# Patient Record
Sex: FEMALE | Race: OTHER | HISPANIC OR LATINO
[De-identification: names, ages, dates, MRNs, and addresses within clinical notes are randomized per-mention and may not be internally consistent; named-entity substitution may affect disease eponyms.]

---

## 2019-10-08 ENCOUNTER — APPOINTMENT (OUTPATIENT)
Dept: ANTEPARTUM | Facility: CLINIC | Age: 49
End: 2019-10-08
Payer: COMMERCIAL

## 2019-10-08 ENCOUNTER — ASOB RESULT (OUTPATIENT)
Age: 49
End: 2019-10-08

## 2019-10-08 PROCEDURE — 76856 US EXAM PELVIC COMPLETE: CPT | Mod: 59

## 2019-10-08 PROCEDURE — 76830 TRANSVAGINAL US NON-OB: CPT

## 2020-07-19 ENCOUNTER — TRANSCRIPTION ENCOUNTER (OUTPATIENT)
Age: 50
End: 2020-07-19

## 2020-08-09 ENCOUNTER — TRANSCRIPTION ENCOUNTER (OUTPATIENT)
Age: 50
End: 2020-08-09

## 2020-10-18 ENCOUNTER — APPOINTMENT (OUTPATIENT)
Dept: DISASTER EMERGENCY | Facility: CLINIC | Age: 50
End: 2020-10-18

## 2020-10-18 DIAGNOSIS — Z01.818 ENCOUNTER FOR OTHER PREPROCEDURAL EXAMINATION: ICD-10-CM

## 2020-10-19 LAB — SARS-COV-2 N GENE NPH QL NAA+PROBE: NOT DETECTED

## 2020-10-25 ENCOUNTER — APPOINTMENT (OUTPATIENT)
Dept: DISASTER EMERGENCY | Facility: CLINIC | Age: 50
End: 2020-10-25

## 2020-10-27 LAB — SARS-COV-2 N GENE NPH QL NAA+PROBE: NOT DETECTED

## 2020-11-01 ENCOUNTER — APPOINTMENT (OUTPATIENT)
Dept: DISASTER EMERGENCY | Facility: CLINIC | Age: 50
End: 2020-11-01

## 2020-11-02 LAB — SARS-COV-2 N GENE NPH QL NAA+PROBE: NOT DETECTED

## 2020-11-17 ENCOUNTER — TRANSCRIPTION ENCOUNTER (OUTPATIENT)
Age: 50
End: 2020-11-17

## 2021-04-27 PROBLEM — Z00.00 ENCOUNTER FOR PREVENTIVE HEALTH EXAMINATION: Noted: 2021-04-27

## 2021-04-28 ENCOUNTER — APPOINTMENT (OUTPATIENT)
Dept: NEUROLOGY | Facility: CLINIC | Age: 51
End: 2021-04-28
Payer: COMMERCIAL

## 2021-04-28 VITALS
HEART RATE: 99 BPM | SYSTOLIC BLOOD PRESSURE: 122 MMHG | BODY MASS INDEX: 29.83 KG/M2 | TEMPERATURE: 98.5 F | WEIGHT: 136.38 LBS | OXYGEN SATURATION: 97 % | HEIGHT: 56.5 IN | DIASTOLIC BLOOD PRESSURE: 75 MMHG

## 2021-04-28 DIAGNOSIS — R29.898 OTHER SYMPTOMS AND SIGNS INVOLVING THE MUSCULOSKELETAL SYSTEM: ICD-10-CM

## 2021-04-28 DIAGNOSIS — R20.2 PARESTHESIA OF SKIN: ICD-10-CM

## 2021-04-28 DIAGNOSIS — R20.0 ANESTHESIA OF SKIN: ICD-10-CM

## 2021-04-28 DIAGNOSIS — Z83.3 FAMILY HISTORY OF DIABETES MELLITUS: ICD-10-CM

## 2021-04-28 DIAGNOSIS — Z83.438 FAMILY HISTORY OF OTHER DISORDER OF LIPOPROTEIN METABOLISM AND OTHER LIPIDEMIA: ICD-10-CM

## 2021-04-28 DIAGNOSIS — Z72.0 TOBACCO USE: ICD-10-CM

## 2021-04-28 DIAGNOSIS — Z82.49 FAMILY HISTORY OF ISCHEMIC HEART DISEASE AND OTHER DISEASES OF THE CIRCULATORY SYSTEM: ICD-10-CM

## 2021-04-28 DIAGNOSIS — Z78.9 OTHER SPECIFIED HEALTH STATUS: ICD-10-CM

## 2021-04-28 DIAGNOSIS — M54.9 DORSALGIA, UNSPECIFIED: ICD-10-CM

## 2021-04-28 DIAGNOSIS — Z85.828 PERSONAL HISTORY OF OTHER MALIGNANT NEOPLASM OF SKIN: ICD-10-CM

## 2021-04-28 PROCEDURE — 99204 OFFICE O/P NEW MOD 45 MIN: CPT

## 2021-04-28 PROCEDURE — 99072 ADDL SUPL MATRL&STAF TM PHE: CPT
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
## 2021-04-28 NOTE — ASSESSMENT
[FreeTextEntry1] : 50 yo woman with low back pain and fluctuating LLE weakness on examination, patient able to walk without difficulty\par \par MRI L spine w/wo contrast to further evaluate etiology for left foot paresthesias and fluctuating LE weakness\par \par EMG/NCS of LLE and bilateral hands\par \par She was advised to notify me for worsening symptoms.\par \par I will see her back after above workup or sooner if necessary.

## 2021-04-28 NOTE — PHYSICAL EXAM
[FreeTextEntry1] : Mental Status: AAO x3, no dysarthria, no aphasia, communicating appropriately\par CN: PERRL, EOMI, VFF, V1-V3 sensation intact, hearing grossly intact, no facial asymmetry, tongue midline\par Motor: \par R deltoids 5/5\par R  biceps 5/5\par R triceps 5/5\par R finger abduction 5/5\par R thumb abduction 5/5\par \par L deltoids 5/5\par L biceps 5/5\par L triceps 5/5\par L finger abduction 5/5\par L thumb abduction 5/5\par \par R hip flexion 5/5\par R knee extension 5/5\par R knee flexion 5/5\par R dorsiflexion 5/5\par R plantar flexion 5/5\par  \par L hip flexion 5/5 fluctuates from 5/5 to 4/5 on exam\par L knee extension 5/5\par L knee flexion 5/5\par L dorsiflexion fluctuates from 5/5 to 3/5 on exam\par L plantar flexion 5/5\par \par Sensory: intact to light touch, pinprick and vibration sense in lower extremities, decreased to pinprick in first three digits of bilateral hands\par Reflexes: absent ankle jerks, otherwise 2 + throughout, toes equivocal bilaterally\par Coordination: no dysmetria on FTN\par Gait: steady\par  [General Appearance - Alert] : alert [General Appearance - In No Acute Distress] : in no acute distress [Sclera] : the sclera and conjunctiva were normal [PERRL With Normal Accommodation] : pupils were equal in size, round, reactive to light, with normal accommodation [Outer Ear] : the ears and nose were normal in appearance [Abnormal Walk] : normal gait [Neck Appearance] : the appearance of the neck was normal [Skin Color & Pigmentation] : normal skin color and pigmentation

## 2021-05-11 ENCOUNTER — APPOINTMENT (OUTPATIENT)
Dept: NEUROLOGY | Facility: CLINIC | Age: 51
End: 2021-05-11

## 2021-05-21 ENCOUNTER — APPOINTMENT (OUTPATIENT)
Dept: NEUROLOGY | Facility: CLINIC | Age: 51
End: 2021-05-21
Payer: COMMERCIAL

## 2021-05-21 PROCEDURE — 95885 MUSC TST DONE W/NERV TST LIM: CPT

## 2021-05-21 PROCEDURE — 95908 NRV CNDJ TST 3-4 STUDIES: CPT

## 2021-05-21 PROCEDURE — 99072 ADDL SUPL MATRL&STAF TM PHE: CPT

## 2021-06-04 ENCOUNTER — APPOINTMENT (OUTPATIENT)
Dept: ORTHOPEDIC SURGERY | Facility: CLINIC | Age: 51
End: 2021-06-04
Payer: OTHER MISCELLANEOUS

## 2021-06-04 VITALS — HEIGHT: 56 IN | WEIGHT: 137 LBS | RESPIRATION RATE: 16 BRPM | BODY MASS INDEX: 30.82 KG/M2

## 2021-06-04 DIAGNOSIS — I10 ESSENTIAL (PRIMARY) HYPERTENSION: ICD-10-CM

## 2021-06-04 DIAGNOSIS — E11.9 TYPE 2 DIABETES MELLITUS W/OUT COMPLICATIONS: ICD-10-CM

## 2021-06-04 PROCEDURE — 99072 ADDL SUPL MATRL&STAF TM PHE: CPT

## 2021-06-04 PROCEDURE — 99213 OFFICE O/P EST LOW 20 MIN: CPT

## 2021-06-04 PROCEDURE — 73110 X-RAY EXAM OF WRIST: CPT | Mod: 50

## 2021-06-04 RX ORDER — METOPROLOL TARTRATE 75 MG/1
TABLET, FILM COATED ORAL
Refills: 0 | Status: ACTIVE | COMMUNITY

## 2021-06-04 RX ORDER — HYDROCHLOROTHIAZIDE 12.5 MG/1
12.5 TABLET ORAL
Refills: 0 | Status: ACTIVE | COMMUNITY

## 2021-06-04 RX ORDER — TRAMADOL HYDROCHLORIDE 25 MG/1
TABLET, COATED ORAL
Refills: 0 | Status: ACTIVE | COMMUNITY

## 2021-06-04 RX ORDER — OLMESARTAN MEDOXOMIL 5 MG/1
5 TABLET, FILM COATED ORAL
Refills: 0 | Status: ACTIVE | COMMUNITY

## 2021-06-04 RX ORDER — DULAGLUTIDE 1.5 MG/.5ML
1.5 INJECTION, SOLUTION SUBCUTANEOUS
Refills: 0 | Status: ACTIVE | COMMUNITY

## 2021-06-04 RX ORDER — GABAPENTIN 800 MG/1
TABLET, FILM COATED ORAL
Refills: 0 | Status: ACTIVE | COMMUNITY

## 2021-06-04 RX ORDER — METFORMIN HYDROCHLORIDE 625 MG/1
TABLET ORAL
Refills: 0 | Status: ACTIVE | COMMUNITY

## 2021-06-07 NOTE — PROCEDURE
[FreeTextEntry3] : EMG/NCS was performed. Please see scanned EMG results for further details.\par \par EMG shows postsurgical changes vs mild chronic lumbar radiculopathy

## 2021-07-14 ENCOUNTER — APPOINTMENT (OUTPATIENT)
Dept: NEUROLOGY | Facility: CLINIC | Age: 51
End: 2021-07-14
Payer: COMMERCIAL

## 2021-07-14 VITALS
BODY MASS INDEX: 30.59 KG/M2 | HEART RATE: 90 BPM | TEMPERATURE: 98.2 F | DIASTOLIC BLOOD PRESSURE: 75 MMHG | OXYGEN SATURATION: 98 % | SYSTOLIC BLOOD PRESSURE: 115 MMHG | HEIGHT: 56 IN | WEIGHT: 136 LBS

## 2021-07-14 DIAGNOSIS — M54.16 RADICULOPATHY, LUMBAR REGION: ICD-10-CM

## 2021-07-14 PROCEDURE — 99214 OFFICE O/P EST MOD 30 MIN: CPT

## 2021-07-14 PROCEDURE — 99072 ADDL SUPL MATRL&STAF TM PHE: CPT

## 2021-07-14 RX ORDER — GABAPENTIN 100 MG/1
100 CAPSULE ORAL
Qty: 60 | Refills: 1 | Status: ACTIVE | COMMUNITY
Start: 2021-07-14 | End: 1900-01-01

## 2021-07-14 NOTE — HISTORY OF PRESENT ILLNESS
[FreeTextEntry1] : Since last visit, patient states she has been getting an electric shock like pain in her left leg associated with low back pain. She had an MRI L spine which showed postsurgical changes and abutment of the right exiting L3 nerve root. She denies having radicular symptoms in her right leg. She no longer has numbness in her hands. She denies bowel or bladder problems. Her EMG/NCS showed no evidence of neuropathy, but showed evidence of a mild left L4-5 lumbar radiculopathy vs postsurgical changes. She is following with pain management for her chronic back pain. She was prescribed gabapentin 300mg at bedtime which seems to help, but she just takes it on rare occasions as needed as it makes her tired. She has no further complaints.

## 2021-07-14 NOTE — ASSESSMENT
[FreeTextEntry1] : 50 yo woman with lumbar radiculopathy\par \par Will prescribe gabapentin 100mg two times a day as 300mg pill makes patient tired\par She was made aware of side effects of this medication and was advised to notify me if she experiences any\par \par She was advised to continue to follow with her pain management doctor for gabapentin prescription and pain management. She states she has a follow up appt this month.\par \par She was advised to follow up with me as needed for new or worsening symptoms

## 2021-07-14 NOTE — PHYSICAL EXAM
[FreeTextEntry1] : Mental Status: AAO x3, no dysarthria, no aphasia, communicating appropriately\par CN: PERRL, EOMI, VFF, V1-V3 sensation intact, hearing grossly intact, no facial asymmetry, tongue midline\par Motor: 5/5 x 4 extremities\par Sensory: intact to light touch throughout\par Reflexes: 1+ throughout, toes equivocal bilaterally\par Coordination: no dysmetria on FTN\par Gait: steady\par \par  [General Appearance - Alert] : alert [General Appearance - In No Acute Distress] : in no acute distress [Sclera] : the sclera and conjunctiva were normal [PERRL With Normal Accommodation] : pupils were equal in size, round, reactive to light, with normal accommodation [Outer Ear] : the ears and nose were normal in appearance [Neck Appearance] : the appearance of the neck was normal [] : no respiratory distress [Abnormal Walk] : normal gait [Skin Color & Pigmentation] : normal skin color and pigmentation

## 2021-11-22 ENCOUNTER — APPOINTMENT (OUTPATIENT)
Dept: RHEUMATOLOGY | Facility: CLINIC | Age: 51
End: 2021-11-22

## 2022-01-18 ENCOUNTER — TRANSCRIPTION ENCOUNTER (OUTPATIENT)
Age: 52
End: 2022-01-18

## 2022-01-19 ENCOUNTER — APPOINTMENT (OUTPATIENT)
Dept: ORTHOPEDIC SURGERY | Facility: AMBULATORY SURGERY CENTER | Age: 52
End: 2022-01-19
Payer: OTHER MISCELLANEOUS

## 2022-01-19 ENCOUNTER — OUTPATIENT (OUTPATIENT)
Dept: OUTPATIENT SERVICES | Facility: HOSPITAL | Age: 52
LOS: 1 days | Discharge: HOME | End: 2022-01-19

## 2022-01-19 LAB — GLUCOSE BLDC GLUCOMTR-MCNC: 119 MG/DL — HIGH (ref 70–99)

## 2022-01-19 PROCEDURE — 25999 UNLISTED PX FOREARM/WRIST: CPT | Mod: RT

## 2022-01-19 PROCEDURE — 20900 REMOVAL OF BONE FOR GRAFT: CPT | Mod: RT

## 2022-01-28 ENCOUNTER — APPOINTMENT (OUTPATIENT)
Dept: ORTHOPEDIC SURGERY | Facility: CLINIC | Age: 52
End: 2022-01-28
Payer: OTHER MISCELLANEOUS

## 2022-01-28 PROCEDURE — 73110 X-RAY EXAM OF WRIST: CPT | Mod: RT

## 2022-01-28 PROCEDURE — 99024 POSTOP FOLLOW-UP VISIT: CPT

## 2022-01-28 RX ORDER — ACETAMINOPHEN AND CODEINE 300; 30 MG/1; MG/1
300-30 TABLET ORAL
Qty: 20 | Refills: 0 | Status: DISCONTINUED | COMMUNITY
Start: 2022-01-18 | End: 2022-01-28

## 2022-02-25 ENCOUNTER — NON-APPOINTMENT (OUTPATIENT)
Age: 52
End: 2022-02-25

## 2022-02-25 ENCOUNTER — APPOINTMENT (OUTPATIENT)
Dept: ORTHOPEDIC SURGERY | Facility: CLINIC | Age: 52
End: 2022-02-25
Payer: OTHER MISCELLANEOUS

## 2022-02-25 PROCEDURE — 99212 OFFICE O/P EST SF 10 MIN: CPT

## 2022-02-25 PROCEDURE — 99072 ADDL SUPL MATRL&STAF TM PHE: CPT

## 2022-04-04 ENCOUNTER — APPOINTMENT (OUTPATIENT)
Dept: ORTHOPEDIC SURGERY | Facility: CLINIC | Age: 52
End: 2022-04-04
Payer: OTHER MISCELLANEOUS

## 2022-04-04 PROCEDURE — 73110 X-RAY EXAM OF WRIST: CPT | Mod: RT

## 2022-04-04 PROCEDURE — 99024 POSTOP FOLLOW-UP VISIT: CPT

## 2022-04-18 ENCOUNTER — APPOINTMENT (OUTPATIENT)
Dept: NEUROLOGY | Facility: CLINIC | Age: 52
End: 2022-04-18
Payer: COMMERCIAL

## 2022-04-18 VITALS
HEART RATE: 102 BPM | SYSTOLIC BLOOD PRESSURE: 128 MMHG | TEMPERATURE: 98 F | DIASTOLIC BLOOD PRESSURE: 77 MMHG | BODY MASS INDEX: 29.69 KG/M2 | OXYGEN SATURATION: 96 % | WEIGHT: 132 LBS | HEIGHT: 56 IN

## 2022-04-18 PROCEDURE — 99214 OFFICE O/P EST MOD 30 MIN: CPT

## 2022-04-18 RX ORDER — DULOXETINE HYDROCHLORIDE 30 MG/1
30 CAPSULE, DELAYED RELEASE PELLETS ORAL
Qty: 30 | Refills: 0 | Status: ACTIVE | COMMUNITY
Start: 2022-03-17

## 2022-04-18 RX ORDER — CLINDAMYCIN HYDROCHLORIDE 300 MG/1
300 CAPSULE ORAL
Qty: 30 | Refills: 0 | Status: ACTIVE | COMMUNITY
Start: 2022-03-29

## 2022-04-18 RX ORDER — DIAZEPAM 5 MG/1
5 TABLET ORAL
Qty: 3 | Refills: 0 | Status: ACTIVE | COMMUNITY
Start: 2022-03-14

## 2022-04-18 RX ORDER — CEFPODOXIME PROXETIL 200 MG/1
200 TABLET, FILM COATED ORAL
Qty: 14 | Refills: 0 | Status: ACTIVE | COMMUNITY
Start: 2022-04-01

## 2022-04-18 RX ORDER — FLUCONAZOLE 150 MG/1
150 TABLET ORAL
Qty: 1 | Refills: 0 | Status: ACTIVE | COMMUNITY
Start: 2022-03-29

## 2022-04-18 RX ORDER — TRIAMCINOLONE ACETONIDE 5 MG/G
0.5 CREAM TOPICAL
Qty: 45 | Refills: 0 | Status: ACTIVE | COMMUNITY
Start: 2021-11-24

## 2022-04-18 RX ORDER — TOBRAMYCIN 3 MG/ML
0.3 SOLUTION/ DROPS OPHTHALMIC
Qty: 5 | Refills: 0 | Status: ACTIVE | COMMUNITY
Start: 2022-03-29

## 2022-04-18 RX ORDER — LORAZEPAM 0.5 MG/1
0.5 TABLET ORAL
Qty: 15 | Refills: 0 | Status: ACTIVE | COMMUNITY
Start: 2021-10-25

## 2022-04-18 RX ORDER — GABAPENTIN 300 MG/1
300 CAPSULE ORAL
Qty: 60 | Refills: 0 | Status: ACTIVE | COMMUNITY
Start: 2021-12-15

## 2022-04-18 RX ORDER — ERYTHROMYCIN 5 MG/G
5 OINTMENT OPHTHALMIC
Qty: 4 | Refills: 0 | Status: ACTIVE | COMMUNITY
Start: 2022-04-05

## 2022-04-18 RX ORDER — OFLOXACIN 3 MG/ML
0.3 SOLUTION/ DROPS OPHTHALMIC
Qty: 5 | Refills: 0 | Status: ACTIVE | COMMUNITY
Start: 2022-04-05

## 2022-04-18 RX ORDER — DULAGLUTIDE 3 MG/.5ML
3 INJECTION, SOLUTION SUBCUTANEOUS
Qty: 6 | Refills: 0 | Status: ACTIVE | COMMUNITY
Start: 2021-11-24

## 2022-04-18 RX ORDER — EMPAGLIFLOZIN 25 MG/1
25 TABLET, FILM COATED ORAL
Qty: 90 | Refills: 0 | Status: ACTIVE | COMMUNITY
Start: 2021-12-14

## 2022-04-18 RX ORDER — BUSPIRONE HYDROCHLORIDE 15 MG/1
15 TABLET ORAL
Qty: 60 | Refills: 0 | Status: ACTIVE | COMMUNITY
Start: 2022-03-17

## 2022-04-18 RX ORDER — ICOSAPENT ETHYL 1000 MG/1
1 CAPSULE ORAL
Qty: 360 | Refills: 0 | Status: ACTIVE | COMMUNITY
Start: 2021-12-03

## 2022-04-18 RX ORDER — DULOXETINE HYDROCHLORIDE 60 MG/1
60 CAPSULE, DELAYED RELEASE PELLETS ORAL
Qty: 30 | Refills: 0 | Status: ACTIVE | COMMUNITY
Start: 2022-03-17

## 2022-04-18 RX ORDER — METFORMIN ER 500 MG 500 MG/1
500 TABLET ORAL
Qty: 360 | Refills: 0 | Status: ACTIVE | COMMUNITY
Start: 2021-08-05

## 2022-04-18 RX ORDER — PITAVASTATIN CALCIUM 1.04 MG/1
1 TABLET, FILM COATED ORAL
Qty: 90 | Refills: 0 | Status: ACTIVE | COMMUNITY
Start: 2022-03-17

## 2022-04-18 RX ORDER — METOPROLOL SUCCINATE 25 MG/1
25 TABLET, EXTENDED RELEASE ORAL
Qty: 270 | Refills: 0 | Status: ACTIVE | COMMUNITY
Start: 2022-02-16

## 2022-04-18 RX ORDER — ERGOCALCIFEROL 1.25 MG/1
1.25 MG CAPSULE, LIQUID FILLED ORAL
Qty: 13 | Refills: 0 | Status: ACTIVE | COMMUNITY
Start: 2021-01-20

## 2022-04-18 NOTE — ASSESSMENT
[FreeTextEntry1] : 51 yo woman with right foot paresthesias 2/2 small fiber neuropathy vs lumbar radiculopathy\par \par EMG/NCS of right lower extremity to evaluate for neuropathy vs radiculopathy. Will also check bilateral upper extremities as pt reports hand paresthesias at times\par \par F/u after EMG/NCS

## 2022-04-18 NOTE — PHYSICAL EXAM
[FreeTextEntry1] : Mental Status: AAO x3, no dysarthria, no aphasia, communicating appropriately\par CN: PERRL, EOMI, VFF, V1-V3 sensation intact, hearing grossly intact, no facial asymmetry, tongue midline\par Motor: \par R deltoids 5/5\par R  biceps 5/5\par R triceps 5/5\par R finger abduction 5/5\par R thumb abduction 5/5\par \par L deltoids 5/5\par L biceps 5/5\par L triceps 5/5\par L finger abduction 5/5\par L thumb abduction 5/5\par \par R hip flexion 5/5\par R knee extension 5/5\par R knee flexion 5/5\par R dorsiflexion 5/5\par R plantar flexion 5/5\par  \par L hip flexion 5/5\par L knee extension 5/5\par L knee flexion 5/5\par L dorsiflexion 5/5\par L plantar flexion 5/5\par \par Sensory: decreased to pinprick up to right ankle, intact vibration sense\par Reflexes: 1+ throughout, toes equivocal bilaterally\par Coordination: no dysmetria on FTN\par Gait: steady\par \par \par  [General Appearance - Alert] : alert [Sclera] : the sclera and conjunctiva were normal [Outer Ear] : the ears and nose were normal in appearance [Neck Appearance] : the appearance of the neck was normal [] : no respiratory distress [Abnormal Walk] : normal gait [Skin Color & Pigmentation] : normal skin color and pigmentation

## 2022-04-18 NOTE — HISTORY OF PRESENT ILLNESS
[FreeTextEntry1] : 51 yo woman with medical conditions as outlined below who presents for further evaluation of right foot paresthesias. She has seen in the past for LLE paresthesias likely 2/2 lumbar radiculopathy. She follows with pain management for this and they are planning for an ERIK, however, this was delayed due to corneal abrasion. She states she now has paresthesias and constant burning in her right foot for the past 6 months. She states she also gets radicular pain down her right leg now. She states she has had diabetes for 21 years. She has no further complaints.

## 2022-05-16 ENCOUNTER — APPOINTMENT (OUTPATIENT)
Dept: ORTHOPEDIC SURGERY | Facility: CLINIC | Age: 52
End: 2022-05-16
Payer: OTHER MISCELLANEOUS

## 2022-05-16 VITALS — WEIGHT: 132 LBS | BODY MASS INDEX: 29.69 KG/M2 | HEIGHT: 56 IN | RESPIRATION RATE: 16 BRPM

## 2022-05-16 PROCEDURE — 99213 OFFICE O/P EST LOW 20 MIN: CPT

## 2022-05-16 PROCEDURE — 73110 X-RAY EXAM OF WRIST: CPT | Mod: RT

## 2022-05-16 PROCEDURE — 99072 ADDL SUPL MATRL&STAF TM PHE: CPT

## 2022-06-29 ENCOUNTER — APPOINTMENT (OUTPATIENT)
Dept: ORTHOPEDIC SURGERY | Facility: CLINIC | Age: 52
End: 2022-06-29

## 2022-06-29 PROCEDURE — 99442: CPT

## 2022-07-15 ENCOUNTER — APPOINTMENT (OUTPATIENT)
Dept: NEUROLOGY | Facility: CLINIC | Age: 52
End: 2022-07-15

## 2022-08-10 ENCOUNTER — APPOINTMENT (OUTPATIENT)
Dept: ORTHOPEDIC SURGERY | Facility: CLINIC | Age: 52
End: 2022-08-10

## 2022-08-10 VITALS — HEIGHT: 56 IN | BODY MASS INDEX: 29.69 KG/M2 | WEIGHT: 132 LBS | RESPIRATION RATE: 16 BRPM

## 2022-08-10 DIAGNOSIS — M93.1 KIENBOCK'S DISEASE OF ADULTS: ICD-10-CM

## 2022-08-10 PROCEDURE — 99213 OFFICE O/P EST LOW 20 MIN: CPT

## 2022-08-10 PROCEDURE — 99072 ADDL SUPL MATRL&STAF TM PHE: CPT

## 2022-08-10 NOTE — ASSESSMENT
[FreeTextEntry1] : Patient has had a 20% improvement of strength since her previous visit but denies any significant improvement in pain levels.\par \par There has been no further progression of the condition on x-ray.\par \par Differential diagnosis was discussed.\par \par Patient is out of work on permanent disability due to other issues.\par \par Options were discussed ranging from conservative management to further work-up.\par \par Patient never tried therapy  but does not have time at this point because of her sick son. \par \par She may consider this in the future.\par \par Elected to proceed with conservative management.\par \par Reassess in 3 months or earlier if symptoms increase or condition deteriorates.

## 2022-08-10 NOTE — REVIEW OF SYSTEMS
[Right] : right [Arthralgia] : arthralgia [Joint Pain] : joint pain [Joint Stiffness] : joint stiffness [Negative] : Allergic/Immunologic

## 2022-08-10 NOTE — PHYSICAL EXAM
[de-identified] : Scar is well-healed there is no evidence of infection no tenderness over the distal radius.  There continues to be tenderness over the lunate of the right wrist but no evidence of instability appreciated.\par \par Flexion/extension is 70/50 on the right and 80/60 on the left\par Pronation/supination is 80/80 symmetric bilaterally\par \par  strength 25 pounds on the right and 45 pounds on the left\par \par There is good capillary refill of the digits bilaterally.There is no masses or sensitivity over the median and ulnar nerves at the level of the wrist. There is a negative Tinel's and negative Phalen's sign bilaterally. The sensation is grossly intact bilaterally. [de-identified] : PA lateral and oblique shows no evidence of progression of the [place].  The scapholunate ligament interval upon stress is similar to the x-rays from 2021.

## 2022-08-10 NOTE — HISTORY OF PRESENT ILLNESS
[Right] : right hand dominant [FreeTextEntry1] : Pt here for f/u from surgery on 1/19/22 for a right distal radius core decompression.  She is 6.5 months s/p surgery.\par Patient has not noticed significant improvement over the past few months.\par Pt had recent MRI of the wrist.

## 2022-11-11 ENCOUNTER — APPOINTMENT (OUTPATIENT)
Dept: ORTHOPEDIC SURGERY | Facility: CLINIC | Age: 52
End: 2022-11-11

## 2022-11-11 ENCOUNTER — APPOINTMENT (OUTPATIENT)
Dept: NEUROLOGY | Facility: CLINIC | Age: 52
End: 2022-11-11

## 2022-11-11 PROCEDURE — 95886 MUSC TEST DONE W/N TEST COMP: CPT

## 2022-11-11 PROCEDURE — 95911 NRV CNDJ TEST 9-10 STUDIES: CPT

## 2022-12-13 ENCOUNTER — OFFICE (OUTPATIENT)
Dept: URBAN - METROPOLITAN AREA CLINIC 115 | Facility: CLINIC | Age: 52
Setting detail: OPHTHALMOLOGY
End: 2022-12-13
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
## 2022-12-13 PROCEDURE — NO SHOW FE NO SHOW FEE: Performed by: OPHTHALMOLOGY

## 2023-01-13 ENCOUNTER — OFFICE (OUTPATIENT)
Dept: URBAN - METROPOLITAN AREA CLINIC 115 | Facility: CLINIC | Age: 53
Setting detail: OPHTHALMOLOGY
End: 2023-01-13
Payer: COMMERCIAL

## 2023-01-13 DIAGNOSIS — H25.13: ICD-10-CM

## 2023-01-13 DIAGNOSIS — H16.222: ICD-10-CM

## 2023-01-13 DIAGNOSIS — H16.221: ICD-10-CM

## 2023-01-13 DIAGNOSIS — H01.005: ICD-10-CM

## 2023-01-13 DIAGNOSIS — H16.041: ICD-10-CM

## 2023-01-13 DIAGNOSIS — H11.153: ICD-10-CM

## 2023-01-13 DIAGNOSIS — H16.223: ICD-10-CM

## 2023-01-13 DIAGNOSIS — H52.4: ICD-10-CM

## 2023-01-13 DIAGNOSIS — H01.002: ICD-10-CM

## 2023-01-13 PROCEDURE — 92014 COMPRE OPH EXAM EST PT 1/>: CPT | Performed by: OPHTHALMOLOGY

## 2023-01-13 PROCEDURE — 92015 DETERMINE REFRACTIVE STATE: CPT | Performed by: OPHTHALMOLOGY

## 2023-01-13 PROCEDURE — 83861 MICROFLUID ANALY TEARS: CPT | Performed by: OPHTHALMOLOGY

## 2023-01-13 ASSESSMENT — REFRACTION_CURRENTRX
OS_SPHERE: PLANO
OD_AXIS: 075
OS_VPRISM_DIRECTION: SV
OD_VPRISM_DIRECTION: SV
OS_CYLINDER: -0.75
OS_AXIS: 100
OS_CYLINDER: -0.50
OD_OVR_VA: 20/
OD_AXIS: 079
OS_SPHERE: -0.75
OS_VPRISM_DIRECTION: SV
OD_CYLINDER: -0.50
OD_AXIS: 075
OD_CYLINDER: -0.50
OD_SPHERE: +0.75
OD_VPRISM_DIRECTION: SV
OS_OVR_VA: 20/
OS_OVR_VA: 20/
OD_OVR_VA: 20/
OD_CYLINDER: -0.75
OD_SPHERE: -0.75
OD_OVR_VA: 20/
OS_AXIS: 105
OS_VPRISM_DIRECTION: SV
OS_AXIS: 107
OS_OVR_VA: 20/
OS_CYLINDER: -0.75
OD_SPHERE: -1.00
OD_VPRISM_DIRECTION: SV
OS_SPHERE: +1.50

## 2023-01-13 ASSESSMENT — REFRACTION_AUTOREFRACTION
OS_CYLINDER: -0.75
OS_SPHERE: PLANO
OD_SPHERE: -0.25
OD_CYLINDER: -0.75
OS_AXIS: 108
OD_AXIS: 083

## 2023-01-13 ASSESSMENT — TONOMETRY
OD_IOP_MMHG: 16
OS_IOP_MMHG: 17

## 2023-01-13 ASSESSMENT — REFRACTION_MANIFEST
OD_SPHERE: -0.75
OD_SPHERE: -0.75
OD_AXIS: 083
OD_VA2: 20/20
OD_VA1: 20/20
OS_SPHERE: PLANO
OD_CYLINDER: -0.76
OS_SPHERE: PLANO
OD_AXIS: 080
OD_ADD: +1.50
OS_CYLINDER: -0.75
OD_ADD: +1.75
OS_ADD: +1.75
OD_VA1: 20/20
OS_VA1: 20/20
OS_SPHERE: PL
OU_VA: 20/20
OS_AXIS: 105
OS_VA2: 20/20
OD_AXIS: 080
OS_ADD: +1.50
OS_AXIS: 108
OS_CYLINDER: -0.75
OS_VA1: 20/20
OD_CYLINDER: -0.50
OS_VA1: 20/20
OD_SPHERE: -0.50
OD_VA1: 20/20
OS_AXIS: 105
OS_CYLINDER: -0.75
OS_ADD: +1.75
OD_CYLINDER: -0.50
OD_ADD: +1.75

## 2023-01-13 ASSESSMENT — SPHEQUIV_DERIVED
OD_SPHEQUIV: -1
OD_SPHEQUIV: -1
OD_SPHEQUIV: -0.625
OD_SPHEQUIV: -0.88

## 2023-01-13 ASSESSMENT — LID EXAM ASSESSMENTS
OD_BLEPHARITIS: RLL 1+
OS_BLEPHARITIS: LLL 1+

## 2023-01-13 ASSESSMENT — KERATOMETRY
OD_AXISANGLE_DEGREES: 161
OS_AXISANGLE_DEGREES: 019
OD_K1POWER_DIOPTERS: 43.50
OS_K2POWER_DIOPTERS: 43.75
OD_K2POWER_DIOPTERS: 44.00
OS_K1POWER_DIOPTERS: 43.25

## 2023-01-13 ASSESSMENT — AXIALLENGTH_DERIVED
OD_AL: 23.85
OD_AL: 23.7446
OD_AL: 23.8935
OD_AL: 23.8935

## 2023-01-13 ASSESSMENT — VISUAL ACUITY
OD_BCVA: 20/20-1
OS_BCVA: 20/20-1

## 2023-01-13 ASSESSMENT — CONFRONTATIONAL VISUAL FIELD TEST (CVF)
OD_FINDINGS: FULL
OS_FINDINGS: FULL

## 2023-01-13 ASSESSMENT — SUPERFICIAL PUNCTATE KERATITIS (SPK)
OD_SPK: 1+ 2+
OS_SPK: 1+

## 2023-06-27 ENCOUNTER — OFFICE (OUTPATIENT)
Dept: URBAN - METROPOLITAN AREA CLINIC 115 | Facility: CLINIC | Age: 53
Setting detail: OPHTHALMOLOGY
End: 2023-06-27
Payer: COMMERCIAL

## 2023-06-27 DIAGNOSIS — H11.153: ICD-10-CM

## 2023-06-27 DIAGNOSIS — H01.002: ICD-10-CM

## 2023-06-27 DIAGNOSIS — H25.13: ICD-10-CM

## 2023-06-27 DIAGNOSIS — H01.005: ICD-10-CM

## 2023-06-27 DIAGNOSIS — H16.222: ICD-10-CM

## 2023-06-27 DIAGNOSIS — H35.033: ICD-10-CM

## 2023-06-27 DIAGNOSIS — H16.221: ICD-10-CM

## 2023-06-27 DIAGNOSIS — H16.041: ICD-10-CM

## 2023-06-27 DIAGNOSIS — E11.9: ICD-10-CM

## 2023-06-27 DIAGNOSIS — H16.223: ICD-10-CM

## 2023-06-27 PROCEDURE — 83861 MICROFLUID ANALY TEARS: CPT | Performed by: OPHTHALMOLOGY

## 2023-06-27 PROCEDURE — 92250 FUNDUS PHOTOGRAPHY W/I&R: CPT | Performed by: OPHTHALMOLOGY

## 2023-06-27 PROCEDURE — 92014 COMPRE OPH EXAM EST PT 1/>: CPT | Performed by: OPHTHALMOLOGY

## 2023-06-27 ASSESSMENT — REFRACTION_CURRENTRX
OD_CYLINDER: -0.50
OS_CYLINDER: -0.75
OS_VPRISM_DIRECTION: SV
OD_SPHERE: +0.75
OD_CYLINDER: -0.50
OD_SPHERE: -0.75
OD_OVR_VA: 20/
OD_CYLINDER: -0.75
OS_VPRISM_DIRECTION: SV
OD_SPHERE: -1.00
OD_OVR_VA: 20/
OD_VPRISM_DIRECTION: SV
OD_OVR_VA: 20/
OS_CYLINDER: -0.75
OS_AXIS: 105
OS_OVR_VA: 20/
OD_AXIS: 075
OD_VPRISM_DIRECTION: SV
OS_AXIS: 107
OS_CYLINDER: -0.50
OS_OVR_VA: 20/
OD_VPRISM_DIRECTION: SV
OS_OVR_VA: 20/
OS_SPHERE: -0.75
OS_SPHERE: +1.50
OD_AXIS: 075
OS_SPHERE: PLANO
OD_AXIS: 079
OS_AXIS: 100
OS_VPRISM_DIRECTION: SV

## 2023-06-27 ASSESSMENT — REFRACTION_MANIFEST
OD_ADD: +1.50
OD_SPHERE: -0.75
OS_CYLINDER: -0.75
OS_SPHERE: PLANO
OS_SPHERE: PLANO
OS_CYLINDER: -0.75
OD_ADD: +1.75
OD_SPHERE: -0.50
OD_CYLINDER: -0.50
OD_CYLINDER: -0.50
OD_AXIS: 080
OS_CYLINDER: -0.75
OS_AXIS: 108
OS_VA1: 20/20
OS_VA2: 20/20
OD_AXIS: 083
OU_VA: 20/20
OS_AXIS: 105
OD_SPHERE: -0.75
OS_VA1: 20/20
OS_SPHERE: PL
OS_ADD: +1.75
OD_VA2: 20/20
OD_VA1: 20/20
OD_AXIS: 080
OD_ADD: +1.75
OS_AXIS: 105
OD_VA1: 20/20
OS_VA1: 20/20
OD_CYLINDER: -0.76
OS_ADD: +1.75
OD_VA1: 20/20
OS_ADD: +1.50

## 2023-06-27 ASSESSMENT — CONFRONTATIONAL VISUAL FIELD TEST (CVF)
OD_FINDINGS: FULL
OS_FINDINGS: FULL

## 2023-06-27 ASSESSMENT — REFRACTION_AUTOREFRACTION
OS_CYLINDER: -0.75
OD_CYLINDER: -0.75
OD_SPHERE: -0.50
OS_AXIS: 108
OD_AXIS: 075
OS_SPHERE: -0.25

## 2023-06-27 ASSESSMENT — AXIALLENGTH_DERIVED
OD_AL: 23.8935
OS_AL: 23.8379
OD_AL: 23.8935
OD_AL: 23.85
OD_AL: 23.8436

## 2023-06-27 ASSESSMENT — SPHEQUIV_DERIVED
OD_SPHEQUIV: -1
OS_SPHEQUIV: -0.625
OD_SPHEQUIV: -0.875
OD_SPHEQUIV: -1
OD_SPHEQUIV: -0.88

## 2023-06-27 ASSESSMENT — KERATOMETRY
OD_K1POWER_DIOPTERS: 43.50
OD_AXISANGLE_DEGREES: 161
OS_K1POWER_DIOPTERS: 43.25
OS_K2POWER_DIOPTERS: 43.75
OS_AXISANGLE_DEGREES: 019
OD_K2POWER_DIOPTERS: 44.00

## 2023-06-27 ASSESSMENT — LID EXAM ASSESSMENTS
OD_BLEPHARITIS: RLL 1+
OS_BLEPHARITIS: LLL 1+

## 2023-06-27 ASSESSMENT — TONOMETRY
OD_IOP_MMHG: 16
OS_IOP_MMHG: 16

## 2023-06-27 ASSESSMENT — VISUAL ACUITY
OS_BCVA: 20/20-1
OD_BCVA: 20/25-1

## 2023-06-27 ASSESSMENT — SUPERFICIAL PUNCTATE KERATITIS (SPK)
OS_SPK: T
OD_SPK: T

## 2023-07-28 ENCOUNTER — OFFICE (OUTPATIENT)
Dept: URBAN - METROPOLITAN AREA CLINIC 116 | Facility: CLINIC | Age: 53
Setting detail: OPHTHALMOLOGY
End: 2023-07-28
Payer: COMMERCIAL

## 2023-07-28 DIAGNOSIS — H02.403: ICD-10-CM

## 2023-07-28 DIAGNOSIS — H02.402: ICD-10-CM

## 2023-07-28 DIAGNOSIS — H02.401: ICD-10-CM

## 2023-07-28 DIAGNOSIS — H11.153: ICD-10-CM

## 2023-07-28 PROBLEM — H16.221 DRY EYE SYNDROME K SICCA; RIGHT EYE, LEFT EYE, BOTH EYES: Status: ACTIVE | Noted: 2023-07-28

## 2023-07-28 PROBLEM — H16.223 DRY EYE SYNDROME K SICCA; RIGHT EYE, LEFT EYE, BOTH EYES: Status: ACTIVE | Noted: 2023-07-28

## 2023-07-28 PROBLEM — H16.222 DRY EYE SYNDROME K SICCA; RIGHT EYE, LEFT EYE, BOTH EYES: Status: ACTIVE | Noted: 2023-07-28

## 2023-07-28 PROCEDURE — 99213 OFFICE O/P EST LOW 20 MIN: CPT | Performed by: OPTOMETRIST

## 2023-07-28 ASSESSMENT — VISUAL ACUITY
OD_BCVA: 20/25-1
OS_BCVA: 20/20-1

## 2023-07-28 ASSESSMENT — SPHEQUIV_DERIVED
OD_SPHEQUIV: -1
OD_SPHEQUIV: -1
OD_SPHEQUIV: -0.875
OD_SPHEQUIV: -0.88
OS_SPHEQUIV: -0.625

## 2023-07-28 ASSESSMENT — TONOMETRY
OS_IOP_MMHG: 17
OD_IOP_MMHG: 17

## 2023-07-28 ASSESSMENT — REFRACTION_MANIFEST
OS_VA2: 20/20
OD_SPHERE: -0.50
OD_ADD: +1.75
OS_CYLINDER: -0.75
OD_SPHERE: -0.75
OD_VA1: 20/20
OS_ADD: +1.50
OS_ADD: +1.75
OS_AXIS: 108
OS_AXIS: 105
OD_AXIS: 083
OS_VA1: 20/20
OD_VA1: 20/20
OD_AXIS: 080
OD_CYLINDER: -0.50
OS_AXIS: 105
OS_SPHERE: PLANO
OD_VA1: 20/20
OS_CYLINDER: -0.75
OS_SPHERE: PLANO
OD_AXIS: 080
OD_CYLINDER: -0.50
OD_ADD: +1.50
OU_VA: 20/20
OD_SPHERE: -0.75
OS_CYLINDER: -0.75
OD_CYLINDER: -0.76
OD_ADD: +1.75
OS_SPHERE: PL
OD_VA2: 20/20
OS_VA1: 20/20
OS_ADD: +1.75
OS_VA1: 20/20

## 2023-07-28 ASSESSMENT — REFRACTION_CURRENTRX
OS_VPRISM_DIRECTION: SV
OD_SPHERE: -1.00
OD_AXIS: 075
OD_VPRISM_DIRECTION: SV
OD_CYLINDER: -0.75
OS_AXIS: 107
OD_CYLINDER: -0.50
OD_SPHERE: -0.75
OS_OVR_VA: 20/
OS_CYLINDER: -0.75
OD_VPRISM_DIRECTION: SV
OD_OVR_VA: 20/
OD_OVR_VA: 20/
OS_CYLINDER: -0.50
OS_SPHERE: PLANO
OS_CYLINDER: -0.75
OD_OVR_VA: 20/
OD_AXIS: 075
OS_SPHERE: +1.50
OD_VPRISM_DIRECTION: SV
OS_VPRISM_DIRECTION: SV
OD_SPHERE: +0.75
OS_AXIS: 105
OS_OVR_VA: 20/
OD_AXIS: 079
OS_VPRISM_DIRECTION: SV
OD_CYLINDER: -0.50
OS_SPHERE: -0.75
OS_OVR_VA: 20/
OS_AXIS: 100

## 2023-07-28 ASSESSMENT — AXIALLENGTH_DERIVED
OD_AL: 23.85
OD_AL: 23.8935
OS_AL: 23.8379
OD_AL: 23.8436
OD_AL: 23.8935

## 2023-07-28 ASSESSMENT — LID EXAM ASSESSMENTS
OD_BLEPHARITIS: RLL 1+
OS_BLEPHARITIS: LLL 1+

## 2023-07-28 ASSESSMENT — SUPERFICIAL PUNCTATE KERATITIS (SPK)
OD_SPK: T
OS_SPK: T

## 2023-07-28 ASSESSMENT — LID POSITION - PTOSIS
OS_PTOSIS: LUL 2+
OD_PTOSIS: RUL 2+

## 2023-07-28 ASSESSMENT — REFRACTION_AUTOREFRACTION
OD_CYLINDER: -0.75
OS_SPHERE: -0.25
OD_AXIS: 075
OS_CYLINDER: -0.75
OD_SPHERE: -0.50
OS_AXIS: 108

## 2023-07-28 ASSESSMENT — KERATOMETRY
OD_K2POWER_DIOPTERS: 44.00
OS_K2POWER_DIOPTERS: 43.75
OD_AXISANGLE_DEGREES: 161
OS_K1POWER_DIOPTERS: 43.25
OD_K1POWER_DIOPTERS: 43.50
OS_AXISANGLE_DEGREES: 019

## 2023-08-12 ENCOUNTER — OFFICE (OUTPATIENT)
Dept: URBAN - METROPOLITAN AREA CLINIC 112 | Facility: CLINIC | Age: 53
Setting detail: OPHTHALMOLOGY
End: 2023-08-12
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
## 2023-08-12 PROCEDURE — NO SHOW FE NO SHOW FEE: Performed by: OPHTHALMOLOGY

## 2023-08-15 ENCOUNTER — RX ONLY (RX ONLY)
Age: 53
End: 2023-08-15

## 2023-08-15 ENCOUNTER — OFFICE (OUTPATIENT)
Dept: URBAN - METROPOLITAN AREA CLINIC 94 | Facility: CLINIC | Age: 53
Setting detail: OPHTHALMOLOGY
End: 2023-08-15
Payer: COMMERCIAL

## 2023-08-15 DIAGNOSIS — H16.222: ICD-10-CM

## 2023-08-15 DIAGNOSIS — H02.422: ICD-10-CM

## 2023-08-15 DIAGNOSIS — H02.834: ICD-10-CM

## 2023-08-15 DIAGNOSIS — H02.521: ICD-10-CM

## 2023-08-15 DIAGNOSIS — H02.423: ICD-10-CM

## 2023-08-15 DIAGNOSIS — H02.831: ICD-10-CM

## 2023-08-15 DIAGNOSIS — H02.524: ICD-10-CM

## 2023-08-15 DIAGNOSIS — H02.421: ICD-10-CM

## 2023-08-15 DIAGNOSIS — H16.221: ICD-10-CM

## 2023-08-15 PROBLEM — H01.005 BLEPHARITIS; RIGHT LOWER LID, , LEFT LOWER LID: Status: ACTIVE | Noted: 2023-08-15

## 2023-08-15 PROBLEM — H01.002 BLEPHARITIS; RIGHT LOWER LID, , LEFT LOWER LID: Status: ACTIVE | Noted: 2023-08-15

## 2023-08-15 PROCEDURE — 83861 MICROFLUID ANALY TEARS: CPT | Performed by: OPHTHALMOLOGY

## 2023-08-15 PROCEDURE — 92285 EXTERNAL OCULAR PHOTOGRAPHY: CPT | Performed by: OPHTHALMOLOGY

## 2023-08-15 PROCEDURE — 99214 OFFICE O/P EST MOD 30 MIN: CPT | Performed by: OPHTHALMOLOGY

## 2023-08-15 PROCEDURE — 92082 INTERMEDIATE VISUAL FIELD XM: CPT | Performed by: OPHTHALMOLOGY

## 2023-08-15 ASSESSMENT — REFRACTION_MANIFEST
OD_CYLINDER: -0.76
OS_ADD: +1.50
OD_ADD: +1.75
OD_CYLINDER: -0.50
OD_SPHERE: -0.75
OD_VA1: 20/20
OS_VA2: 20/20
OS_SPHERE: PLANO
OS_CYLINDER: -0.75
OD_SPHERE: -0.50
OS_SPHERE: PLANO
OS_AXIS: 108
OS_VA1: 20/20
OD_VA1: 20/20
OD_SPHERE: -0.75
OU_VA: 20/20
OD_ADD: +1.75
OS_VA1: 20/20
OD_VA2: 20/20
OD_AXIS: 080
OS_CYLINDER: -0.75
OS_ADD: +1.75
OD_ADD: +1.50
OD_CYLINDER: -0.50
OS_ADD: +1.75
OD_AXIS: 080
OD_VA1: 20/20
OS_SPHERE: PL
OS_AXIS: 105
OS_CYLINDER: -0.75
OS_AXIS: 105
OD_AXIS: 083
OS_VA1: 20/20

## 2023-08-15 ASSESSMENT — REFRACTION_CURRENTRX
OS_SPHERE: -0.75
OD_SPHERE: -0.75
OD_OVR_VA: 20/
OS_VPRISM_DIRECTION: SV
OS_OVR_VA: 20/
OS_AXIS: 105
OD_AXIS: 079
OS_VPRISM_DIRECTION: SV
OS_CYLINDER: -0.75
OD_CYLINDER: -0.50
OS_OVR_VA: 20/
OD_VPRISM_DIRECTION: SV
OS_CYLINDER: -0.50
OD_OVR_VA: 20/
OS_CYLINDER: -0.75
OS_AXIS: 107
OD_CYLINDER: -0.75
OD_CYLINDER: -0.50
OS_VPRISM_DIRECTION: SV
OD_VPRISM_DIRECTION: SV
OD_SPHERE: -1.00
OD_OVR_VA: 20/
OS_AXIS: 100
OD_AXIS: 075
OS_SPHERE: +1.50
OD_AXIS: 075
OD_VPRISM_DIRECTION: SV
OD_SPHERE: +0.75
OS_SPHERE: PLANO
OS_OVR_VA: 20/

## 2023-08-15 ASSESSMENT — KERATOMETRY
OS_K1POWER_DIOPTERS: 43.25
OD_K1POWER_DIOPTERS: 43.50
OD_AXISANGLE_DEGREES: 161
OS_K2POWER_DIOPTERS: 43.75
OD_K2POWER_DIOPTERS: 44.00
OS_AXISANGLE_DEGREES: 019

## 2023-08-15 ASSESSMENT — AXIALLENGTH_DERIVED
OD_AL: 23.8935
OD_AL: 23.8436
OS_AL: 23.8379
OD_AL: 23.8935
OD_AL: 23.85

## 2023-08-15 ASSESSMENT — REFRACTION_AUTOREFRACTION
OS_CYLINDER: -0.75
OS_AXIS: 108
OD_AXIS: 075
OD_SPHERE: -0.50
OS_SPHERE: -0.25
OD_CYLINDER: -0.75

## 2023-08-15 ASSESSMENT — SUPERFICIAL PUNCTATE KERATITIS (SPK)
OS_SPK: T
OD_SPK: T

## 2023-08-15 ASSESSMENT — SPHEQUIV_DERIVED
OD_SPHEQUIV: -1
OD_SPHEQUIV: -0.88
OS_SPHEQUIV: -0.625
OD_SPHEQUIV: -0.875
OD_SPHEQUIV: -1

## 2023-08-15 ASSESSMENT — CONFRONTATIONAL VISUAL FIELD TEST (CVF)
OS_FINDINGS: FULL
OD_FINDINGS: FULL

## 2023-08-15 ASSESSMENT — LID EXAM ASSESSMENTS
OD_BLEPHARITIS: RLL 1+
OS_BLEPHARITIS: LLL 1+

## 2023-08-15 ASSESSMENT — VISUAL ACUITY
OD_BCVA: 20/30
OS_BCVA: 20/25

## 2024-01-19 ENCOUNTER — APPOINTMENT (OUTPATIENT)
Dept: OBGYN | Facility: CLINIC | Age: 54
End: 2024-01-19

## 2025-08-01 ENCOUNTER — APPOINTMENT (OUTPATIENT)
Dept: NEUROLOGY | Facility: CLINIC | Age: 55
End: 2025-08-01
Payer: COMMERCIAL

## 2025-08-01 ENCOUNTER — NON-APPOINTMENT (OUTPATIENT)
Age: 55
End: 2025-08-01

## 2025-08-01 VITALS
HEART RATE: 94 BPM | DIASTOLIC BLOOD PRESSURE: 72 MMHG | BODY MASS INDEX: 31.49 KG/M2 | HEIGHT: 56 IN | WEIGHT: 140 LBS | SYSTOLIC BLOOD PRESSURE: 114 MMHG

## 2025-08-01 DIAGNOSIS — R41.3 OTHER AMNESIA: ICD-10-CM

## 2025-08-01 PROCEDURE — 99214 OFFICE O/P EST MOD 30 MIN: CPT

## 2025-09-05 ENCOUNTER — APPOINTMENT (OUTPATIENT)
Dept: MRI IMAGING | Facility: CLINIC | Age: 55
End: 2025-09-05

## 2025-09-17 ENCOUNTER — APPOINTMENT (OUTPATIENT)
Dept: NEUROLOGY | Facility: CLINIC | Age: 55
End: 2025-09-17

## (undated) DEVICE — DRAPE C ARM MINI PACK FOR 6800

## (undated) DEVICE — SUT VICRYL 4-0 18" P-3 UNDYED

## (undated) DEVICE — SLV COMPRESSION KNEE MED

## (undated) DEVICE — WARMING BLANKET LOWER ADULT

## (undated) DEVICE — PACK HAND

## (undated) DEVICE — MARKING PEN W RULER

## (undated) DEVICE — DRSG KERLIX ROLL 4.5"

## (undated) DEVICE — TOURNIQUET CUFF 24" DUAL PORT SINGLE BLADDER W PLC (BLACK)

## (undated) DEVICE — TOURNIQUET CUFF 18" DUAL PORT SINGLE BLADDER W PLC  (BLACK)

## (undated) DEVICE — DRSG STERISTRIPS 0.25 X 3"

## (undated) DEVICE — SUT ETHILON 5-0 18" P-3

## (undated) DEVICE — GLV 8 PROTEXIS (WHITE)

## (undated) DEVICE — SUT VICRYL 4-0 18" PS-2 UNDYED

## (undated) DEVICE — BLADE SCALPEL SAFETY #15 WITH PLASTIC GREEN HANDLE